# Patient Record
Sex: MALE | Race: ASIAN
[De-identification: names, ages, dates, MRNs, and addresses within clinical notes are randomized per-mention and may not be internally consistent; named-entity substitution may affect disease eponyms.]

---

## 2023-01-10 PROBLEM — Z00.00 ENCOUNTER FOR PREVENTIVE HEALTH EXAMINATION: Status: ACTIVE | Noted: 2023-01-10

## 2023-01-11 ENCOUNTER — APPOINTMENT (OUTPATIENT)
Dept: RHEUMATOLOGY | Facility: CLINIC | Age: 83
End: 2023-01-11
Payer: MEDICARE

## 2023-01-11 VITALS
DIASTOLIC BLOOD PRESSURE: 81 MMHG | BODY MASS INDEX: 27.09 KG/M2 | WEIGHT: 138 LBS | SYSTOLIC BLOOD PRESSURE: 145 MMHG | HEART RATE: 97 BPM | OXYGEN SATURATION: 97 % | HEIGHT: 60 IN

## 2023-01-11 DIAGNOSIS — Z86.79 PERSONAL HISTORY OF OTHER DISEASES OF THE CIRCULATORY SYSTEM: ICD-10-CM

## 2023-01-11 DIAGNOSIS — Z78.9 OTHER SPECIFIED HEALTH STATUS: ICD-10-CM

## 2023-01-11 DIAGNOSIS — Z87.39 PERSONAL HISTORY OF OTHER DISEASES OF THE MUSCULOSKELETAL SYSTEM AND CONNECTIVE TISSUE: ICD-10-CM

## 2023-01-11 DIAGNOSIS — M10.9 GOUT, UNSPECIFIED: ICD-10-CM

## 2023-01-11 PROCEDURE — 99205 OFFICE O/P NEW HI 60 MIN: CPT

## 2023-01-11 RX ORDER — CELECOXIB 200 MG/1
200 CAPSULE ORAL
Refills: 0 | Status: ACTIVE | COMMUNITY

## 2023-01-11 RX ORDER — IBUPROFEN 200 MG/1
200 TABLET, FILM COATED ORAL
Refills: 0 | Status: ACTIVE | COMMUNITY

## 2023-01-11 RX ORDER — COLCHICINE 0.6 MG/1
0.6 CAPSULE ORAL
Refills: 0 | Status: ACTIVE | COMMUNITY

## 2023-01-11 NOTE — PHYSICAL EXAM
[General Appearance - Alert] : alert [General Appearance - In No Acute Distress] : in no acute distress [Sclera] : the sclera and conjunctiva were normal [PERRL With Normal Accommodation] : pupils were equal in size, round, and reactive to light [Extraocular Movements] : extraocular movements were intact [Outer Ear] : the ears and nose were normal in appearance [Oropharynx] : the oropharynx was normal [Neck Appearance] : the appearance of the neck was normal [Neck Cervical Mass (___cm)] : no neck mass was observed [Jugular Venous Distention Increased] : there was no jugular-venous distention [Thyroid Diffuse Enlargement] : the thyroid was not enlarged [Thyroid Nodule] : there were no palpable thyroid nodules [Auscultation Breath Sounds / Voice Sounds] : lungs were clear to auscultation bilaterally [Heart Rate And Rhythm] : heart rate was normal and rhythm regular [Heart Sounds] : normal S1 and S2 [Heart Sounds Gallop] : no gallops [Murmurs] : no murmurs [Heart Sounds Pericardial Friction Rub] : no pericardial rub [Bowel Sounds] : normal bowel sounds [Abdomen Soft] : soft [Abdomen Tenderness] : non-tender [Abdomen Mass (___ Cm)] : no abdominal mass palpated [Abnormal Walk] : normal gait [Nail Clubbing] : no clubbing  or cyanosis of the fingernails [Involuntary Movements] : no involuntary movements were seen [Motor Tone] : muscle strength and tone were normal [FreeTextEntry1] : Right 2nd, 3rd, 4th toes swollen, red, tender. Right DIPs with heberden nodes [] : no rash [Affect] : the affect was normal [Mood] : the mood was normal

## 2023-01-11 NOTE — HISTORY OF PRESENT ILLNESS
[FreeTextEntry1] : 82 year old male with a history of hypertension, BPH, gout, kidney stones, here for evaluation of gout. He reports being diagnosed with gout 15 years ago after developing pain in his left big toe and then alternated to his right foot and big toe. He reports getting gout attacks monthly. Beer, shellfish, steak, meat, pepper, mustard all trigger his gout attacks. He usually takes Celebrex and Colchicine that help his attacks but his most recent attack 3 weeks ago is refractory to celebrex and colchicine, and his insurance is not paying for Colcrys. Motrin helps the pain somewhat but it's a shortlived effect. He denies being on allopurinol, but review of his chart indicates that he is on allopurinol 100 mg by his primary care doctor.  He has pain in his right 2nd, 3rd, 4th toes with redness and swelling.

## 2023-01-11 NOTE — ASSESSMENT
[FreeTextEntry1] : Gout\par -Diagnosed 15 years ago\par -Having monthly gout attacks. Current attack is refractory to his usual medications celebrex and colchicine\par -Check right foot x-ray\par -Check CBC, CMP, uric acid, HLA B*5801\par -Start prednisone 20 mg daily until symptoms resolve\par -Continue colchicine 0.6 mg daily\par -increase allopurinol to 200 mg daily after HLA B*5801\par -F/u 1 month

## 2023-02-14 ENCOUNTER — APPOINTMENT (OUTPATIENT)
Dept: RHEUMATOLOGY | Facility: CLINIC | Age: 83
End: 2023-02-14
Payer: MEDICARE

## 2023-02-14 VITALS
DIASTOLIC BLOOD PRESSURE: 80 MMHG | SYSTOLIC BLOOD PRESSURE: 137 MMHG | OXYGEN SATURATION: 97 % | BODY MASS INDEX: 27.48 KG/M2 | HEART RATE: 71 BPM | WEIGHT: 140 LBS | HEIGHT: 60 IN

## 2023-02-14 PROCEDURE — 99213 OFFICE O/P EST LOW 20 MIN: CPT

## 2023-03-13 NOTE — HISTORY OF PRESENT ILLNESS
[FreeTextEntry1] : 2/14/23:\par He reports taking prednisone and colchicine for 2 weeks and his symptoms of foot pain resolved. Denies any recurrence of gout. He didn't start allopurinol as the pharmacy did not dispense this medication. \par \par Initial visit:\par 82 year old male with a history of hypertension, BPH, gout, kidney stones, here for evaluation of gout. He reports being diagnosed with gout 15 years ago after developing pain in his left big toe and then alternated to his right foot and big toe. He reports getting gout attacks monthly. Beer, shellfish, steak, meat, pepper, mustard all trigger his gout attacks. He usually takes Celebrex and Colchicine that help his attacks but his most recent attack 3 weeks ago is refractory to celebrex and colchicine, and his insurance is not paying for Colcrys. Motrin helps the pain somewhat but it's a shortlived effect. He denies being on allopurinol, but review of his chart indicates that he is on allopurinol 100 mg by his primary care doctor.  He has pain in his right 2nd, 3rd, 4th toes with redness and swelling.

## 2023-03-13 NOTE — PHYSICAL EXAM
[General Appearance - Alert] : alert [General Appearance - In No Acute Distress] : in no acute distress [Sclera] : the sclera and conjunctiva were normal [PERRL With Normal Accommodation] : pupils were equal in size, round, and reactive to light [Extraocular Movements] : extraocular movements were intact [Outer Ear] : the ears and nose were normal in appearance [Oropharynx] : the oropharynx was normal [Neck Appearance] : the appearance of the neck was normal [Neck Cervical Mass (___cm)] : no neck mass was observed [Jugular Venous Distention Increased] : there was no jugular-venous distention [Thyroid Diffuse Enlargement] : the thyroid was not enlarged [Thyroid Nodule] : there were no palpable thyroid nodules [Auscultation Breath Sounds / Voice Sounds] : lungs were clear to auscultation bilaterally [Heart Rate And Rhythm] : heart rate was normal and rhythm regular [Heart Sounds Gallop] : no gallops [Heart Sounds] : normal S1 and S2 [Murmurs] : no murmurs [Heart Sounds Pericardial Friction Rub] : no pericardial rub [Bowel Sounds] : normal bowel sounds [Abdomen Soft] : soft [Abdomen Tenderness] : non-tender [Abdomen Mass (___ Cm)] : no abdominal mass palpated [Abnormal Walk] : normal gait [Nail Clubbing] : no clubbing  or cyanosis of the fingernails [Involuntary Movements] : no involuntary movements were seen [Motor Tone] : muscle strength and tone were normal [] : no rash [Affect] : the affect was normal [Mood] : the mood was normal [FreeTextEntry1] : No foot swelling or tenderness. Right DIPs with heberden nodes

## 2023-03-13 NOTE — ASSESSMENT
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 Current attack is refractory to his usual medications celebrex and colchicine\par -Right foot xray suggested tophi in right big toe\par -Uric acid 7.2\par -Prednisone 20 mg daily and colchicine 0.6 mg daily PRN for flare\par -Start allopurinol to 200 mg daily \par -Repeat uric acid for goal 5 or less\par -F/u 3 months or PRN\par \par 3/13/23: I spoke with wife he has having pain since starting allopurinol. Take colchicine 0.6 mg daily for ULT prophylaxis. Follow up in May they will reach out with any issues

## 2023-05-09 ENCOUNTER — APPOINTMENT (OUTPATIENT)
Dept: RHEUMATOLOGY | Facility: CLINIC | Age: 83
End: 2023-05-09
Payer: MEDICARE

## 2023-05-09 VITALS
HEART RATE: 74 BPM | DIASTOLIC BLOOD PRESSURE: 63 MMHG | BODY MASS INDEX: 26.43 KG/M2 | SYSTOLIC BLOOD PRESSURE: 136 MMHG | WEIGHT: 140 LBS | HEIGHT: 61 IN | OXYGEN SATURATION: 92 %

## 2023-05-09 PROCEDURE — 99213 OFFICE O/P EST LOW 20 MIN: CPT

## 2023-05-09 RX ORDER — PREDNISONE 20 MG/1
20 TABLET ORAL DAILY
Qty: 30 | Refills: 0 | Status: ACTIVE | COMMUNITY
Start: 2023-01-11 | End: 1900-01-01

## 2023-05-09 RX ORDER — COLCHICINE 0.6 MG/1
0.6 TABLET ORAL DAILY
Qty: 90 | Refills: 1 | Status: ACTIVE | COMMUNITY
Start: 2023-01-11 | End: 1900-01-01

## 2023-05-09 NOTE — ASSESSMENT
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

## 2023-05-09 NOTE — HISTORY OF PRESENT ILLNESS
[FreeTextEntry1] : 5/9/23:\par He reports no recent attacks of gout and feels fine today. He is taking allopurinol 100 mg 2 tabs/day and colchicine 0.6 mg daily\par \par \par 2/14/23:\par He reports taking prednisone and colchicine for 2 weeks and his symptoms of foot pain resolved. Denies any recurrence of gout. He didn't start allopurinol as the pharmacy did not dispense this medication. \par \par Initial visit:\par 82 year old male with a history of hypertension, BPH, gout, kidney stones, here for evaluation of gout. He reports being diagnosed with gout 15 years ago after developing pain in his left big toe and then alternated to his right foot and big toe. He reports getting gout attacks monthly. Beer, shellfish, steak, meat, pepper, mustard all trigger his gout attacks. He usually takes Celebrex and Colchicine that help his attacks but his most recent attack 3 weeks ago is refractory to celebrex and colchicine, and his insurance is not paying for Colcrys. Motrin helps the pain somewhat but it's a shortlived effect. He denies being on allopurinol, but review of his chart indicates that he is on allopurinol 100 mg by his primary care doctor.  He has pain in his right 2nd, 3rd, 4th toes with redness and swelling.

## 2023-08-09 ENCOUNTER — APPOINTMENT (OUTPATIENT)
Dept: RHEUMATOLOGY | Facility: CLINIC | Age: 83
End: 2023-08-09

## 2023-08-11 ENCOUNTER — RX RENEWAL (OUTPATIENT)
Age: 83
End: 2023-08-11

## 2023-08-11 RX ORDER — ALLOPURINOL 100 MG/1
100 TABLET ORAL
Qty: 60 | Refills: 3 | Status: ACTIVE | COMMUNITY
Start: 2023-01-18 | End: 1900-01-01

## 2023-12-09 ENCOUNTER — INPATIENT (INPATIENT)
Facility: HOSPITAL | Age: 83
LOS: 1 days | Discharge: HOME | DRG: 872 | End: 2023-12-11
Attending: INTERNAL MEDICINE | Admitting: INTERNAL MEDICINE
Payer: MEDICARE

## 2023-12-09 VITALS
OXYGEN SATURATION: 98 % | HEART RATE: 71 BPM | SYSTOLIC BLOOD PRESSURE: 136 MMHG | RESPIRATION RATE: 17 BRPM | DIASTOLIC BLOOD PRESSURE: 76 MMHG | TEMPERATURE: 97 F

## 2023-12-09 DIAGNOSIS — A41.9 SEPSIS, UNSPECIFIED ORGANISM: ICD-10-CM

## 2023-12-09 LAB
ACANTHOCYTES BLD QL SMEAR: SLIGHT — SIGNIFICANT CHANGE UP
ACANTHOCYTES BLD QL SMEAR: SLIGHT — SIGNIFICANT CHANGE UP
ALBUMIN SERPL ELPH-MCNC: 4.3 G/DL — SIGNIFICANT CHANGE UP (ref 3.5–5.2)
ALBUMIN SERPL ELPH-MCNC: 4.3 G/DL — SIGNIFICANT CHANGE UP (ref 3.5–5.2)
ALP SERPL-CCNC: 70 U/L — SIGNIFICANT CHANGE UP (ref 30–115)
ALP SERPL-CCNC: 70 U/L — SIGNIFICANT CHANGE UP (ref 30–115)
ALT FLD-CCNC: 16 U/L — SIGNIFICANT CHANGE UP (ref 0–41)
ALT FLD-CCNC: 16 U/L — SIGNIFICANT CHANGE UP (ref 0–41)
ANION GAP SERPL CALC-SCNC: 12 MMOL/L — SIGNIFICANT CHANGE UP (ref 7–14)
ANION GAP SERPL CALC-SCNC: 12 MMOL/L — SIGNIFICANT CHANGE UP (ref 7–14)
ANISOCYTOSIS BLD QL: SLIGHT — SIGNIFICANT CHANGE UP
ANISOCYTOSIS BLD QL: SLIGHT — SIGNIFICANT CHANGE UP
APTT BLD: 27.2 SEC — SIGNIFICANT CHANGE UP (ref 27–39.2)
APTT BLD: 27.2 SEC — SIGNIFICANT CHANGE UP (ref 27–39.2)
AST SERPL-CCNC: 28 U/L — SIGNIFICANT CHANGE UP (ref 0–41)
AST SERPL-CCNC: 28 U/L — SIGNIFICANT CHANGE UP (ref 0–41)
BASOPHILS # BLD AUTO: 0.04 K/UL — SIGNIFICANT CHANGE UP (ref 0–0.2)
BASOPHILS # BLD AUTO: 0.04 K/UL — SIGNIFICANT CHANGE UP (ref 0–0.2)
BASOPHILS NFR BLD AUTO: 0.8 % — SIGNIFICANT CHANGE UP (ref 0–1)
BASOPHILS NFR BLD AUTO: 0.8 % — SIGNIFICANT CHANGE UP (ref 0–1)
BILIRUB SERPL-MCNC: 0.4 MG/DL — SIGNIFICANT CHANGE UP (ref 0.2–1.2)
BILIRUB SERPL-MCNC: 0.4 MG/DL — SIGNIFICANT CHANGE UP (ref 0.2–1.2)
BUN SERPL-MCNC: 18 MG/DL — SIGNIFICANT CHANGE UP (ref 10–20)
BUN SERPL-MCNC: 18 MG/DL — SIGNIFICANT CHANGE UP (ref 10–20)
BURR CELLS BLD QL SMEAR: PRESENT — SIGNIFICANT CHANGE UP
BURR CELLS BLD QL SMEAR: PRESENT — SIGNIFICANT CHANGE UP
CALCIUM SERPL-MCNC: 8.5 MG/DL — SIGNIFICANT CHANGE UP (ref 8.4–10.4)
CALCIUM SERPL-MCNC: 8.5 MG/DL — SIGNIFICANT CHANGE UP (ref 8.4–10.4)
CHLORIDE SERPL-SCNC: 98 MMOL/L — SIGNIFICANT CHANGE UP (ref 98–110)
CHLORIDE SERPL-SCNC: 98 MMOL/L — SIGNIFICANT CHANGE UP (ref 98–110)
CK MB CFR SERPL CALC: 1.5 NG/ML — SIGNIFICANT CHANGE UP (ref 0.6–6.3)
CK MB CFR SERPL CALC: 1.5 NG/ML — SIGNIFICANT CHANGE UP (ref 0.6–6.3)
CK SERPL-CCNC: 153 U/L — SIGNIFICANT CHANGE UP (ref 0–225)
CK SERPL-CCNC: 153 U/L — SIGNIFICANT CHANGE UP (ref 0–225)
CO2 SERPL-SCNC: 23 MMOL/L — SIGNIFICANT CHANGE UP (ref 17–32)
CO2 SERPL-SCNC: 23 MMOL/L — SIGNIFICANT CHANGE UP (ref 17–32)
CREAT SERPL-MCNC: 1.2 MG/DL — SIGNIFICANT CHANGE UP (ref 0.7–1.5)
CREAT SERPL-MCNC: 1.2 MG/DL — SIGNIFICANT CHANGE UP (ref 0.7–1.5)
EGFR: 60 ML/MIN/1.73M2 — SIGNIFICANT CHANGE UP
EGFR: 60 ML/MIN/1.73M2 — SIGNIFICANT CHANGE UP
EOSINOPHIL # BLD AUTO: 0 K/UL — SIGNIFICANT CHANGE UP (ref 0–0.7)
EOSINOPHIL # BLD AUTO: 0 K/UL — SIGNIFICANT CHANGE UP (ref 0–0.7)
EOSINOPHIL NFR BLD AUTO: 0 % — SIGNIFICANT CHANGE UP (ref 0–8)
EOSINOPHIL NFR BLD AUTO: 0 % — SIGNIFICANT CHANGE UP (ref 0–8)
ETHANOL SERPL-MCNC: <10 MG/DL — SIGNIFICANT CHANGE UP
ETHANOL SERPL-MCNC: <10 MG/DL — SIGNIFICANT CHANGE UP
FLUAV AG NPH QL: DETECTED
FLUAV AG NPH QL: DETECTED
FLUBV AG NPH QL: SIGNIFICANT CHANGE UP
FLUBV AG NPH QL: SIGNIFICANT CHANGE UP
GIANT PLATELETS BLD QL SMEAR: PRESENT — SIGNIFICANT CHANGE UP
GIANT PLATELETS BLD QL SMEAR: PRESENT — SIGNIFICANT CHANGE UP
GLUCOSE SERPL-MCNC: 120 MG/DL — HIGH (ref 70–99)
GLUCOSE SERPL-MCNC: 120 MG/DL — HIGH (ref 70–99)
HCT VFR BLD CALC: 38.1 % — LOW (ref 42–52)
HCT VFR BLD CALC: 38.1 % — LOW (ref 42–52)
HGB BLD-MCNC: 12.3 G/DL — LOW (ref 14–18)
HGB BLD-MCNC: 12.3 G/DL — LOW (ref 14–18)
HYPOCHROMIA BLD QL: SLIGHT — SIGNIFICANT CHANGE UP
HYPOCHROMIA BLD QL: SLIGHT — SIGNIFICANT CHANGE UP
INR BLD: 1.1 RATIO — SIGNIFICANT CHANGE UP (ref 0.65–1.3)
INR BLD: 1.1 RATIO — SIGNIFICANT CHANGE UP (ref 0.65–1.3)
LACTATE SERPL-SCNC: 1.2 MMOL/L — SIGNIFICANT CHANGE UP (ref 0.7–2)
LACTATE SERPL-SCNC: 1.2 MMOL/L — SIGNIFICANT CHANGE UP (ref 0.7–2)
LYMPHOCYTES # BLD AUTO: 0.27 K/UL — LOW (ref 1.2–3.4)
LYMPHOCYTES # BLD AUTO: 0.27 K/UL — LOW (ref 1.2–3.4)
LYMPHOCYTES # BLD AUTO: 5.2 % — LOW (ref 20.5–51.1)
LYMPHOCYTES # BLD AUTO: 5.2 % — LOW (ref 20.5–51.1)
MANUAL SMEAR VERIFICATION: SIGNIFICANT CHANGE UP
MANUAL SMEAR VERIFICATION: SIGNIFICANT CHANGE UP
MCHC RBC-ENTMCNC: 22.6 PG — LOW (ref 27–31)
MCHC RBC-ENTMCNC: 22.6 PG — LOW (ref 27–31)
MCHC RBC-ENTMCNC: 32.3 G/DL — SIGNIFICANT CHANGE UP (ref 32–37)
MCHC RBC-ENTMCNC: 32.3 G/DL — SIGNIFICANT CHANGE UP (ref 32–37)
MCV RBC AUTO: 70 FL — LOW (ref 80–94)
MCV RBC AUTO: 70 FL — LOW (ref 80–94)
METAMYELOCYTES # FLD: 0.9 % — HIGH (ref 0–0)
METAMYELOCYTES # FLD: 0.9 % — HIGH (ref 0–0)
MICROCYTES BLD QL: SLIGHT — SIGNIFICANT CHANGE UP
MICROCYTES BLD QL: SLIGHT — SIGNIFICANT CHANGE UP
MONOCYTES # BLD AUTO: 0.4 K/UL — SIGNIFICANT CHANGE UP (ref 0.1–0.6)
MONOCYTES # BLD AUTO: 0.4 K/UL — SIGNIFICANT CHANGE UP (ref 0.1–0.6)
MONOCYTES NFR BLD AUTO: 7.7 % — SIGNIFICANT CHANGE UP (ref 1.7–9.3)
MONOCYTES NFR BLD AUTO: 7.7 % — SIGNIFICANT CHANGE UP (ref 1.7–9.3)
NEUTROPHILS # BLD AUTO: 4.18 K/UL — SIGNIFICANT CHANGE UP (ref 1.4–6.5)
NEUTROPHILS # BLD AUTO: 4.18 K/UL — SIGNIFICANT CHANGE UP (ref 1.4–6.5)
NEUTROPHILS NFR BLD AUTO: 80.2 % — HIGH (ref 42.2–75.2)
NEUTROPHILS NFR BLD AUTO: 80.2 % — HIGH (ref 42.2–75.2)
NRBC # BLD: 1 /100 WBCS — HIGH (ref 0–0)
NRBC # BLD: 1 /100 WBCS — HIGH (ref 0–0)
NRBC # BLD: SIGNIFICANT CHANGE UP /100 WBCS (ref 0–0)
NRBC # BLD: SIGNIFICANT CHANGE UP /100 WBCS (ref 0–0)
PLAT MORPH BLD: ABNORMAL
PLAT MORPH BLD: ABNORMAL
PLATELET # BLD AUTO: 211 K/UL — SIGNIFICANT CHANGE UP (ref 130–400)
PLATELET # BLD AUTO: 211 K/UL — SIGNIFICANT CHANGE UP (ref 130–400)
PMV BLD: 10 FL — SIGNIFICANT CHANGE UP (ref 7.4–10.4)
PMV BLD: 10 FL — SIGNIFICANT CHANGE UP (ref 7.4–10.4)
POIKILOCYTOSIS BLD QL AUTO: SLIGHT — SIGNIFICANT CHANGE UP
POIKILOCYTOSIS BLD QL AUTO: SLIGHT — SIGNIFICANT CHANGE UP
POLYCHROMASIA BLD QL SMEAR: SLIGHT — SIGNIFICANT CHANGE UP
POLYCHROMASIA BLD QL SMEAR: SLIGHT — SIGNIFICANT CHANGE UP
POTASSIUM SERPL-MCNC: 4.6 MMOL/L — SIGNIFICANT CHANGE UP (ref 3.5–5)
POTASSIUM SERPL-MCNC: 4.6 MMOL/L — SIGNIFICANT CHANGE UP (ref 3.5–5)
POTASSIUM SERPL-SCNC: 4.6 MMOL/L — SIGNIFICANT CHANGE UP (ref 3.5–5)
POTASSIUM SERPL-SCNC: 4.6 MMOL/L — SIGNIFICANT CHANGE UP (ref 3.5–5)
PROT SERPL-MCNC: 6.7 G/DL — SIGNIFICANT CHANGE UP (ref 6–8)
PROT SERPL-MCNC: 6.7 G/DL — SIGNIFICANT CHANGE UP (ref 6–8)
PROTHROM AB SERPL-ACNC: 12.6 SEC — SIGNIFICANT CHANGE UP (ref 9.95–12.87)
PROTHROM AB SERPL-ACNC: 12.6 SEC — SIGNIFICANT CHANGE UP (ref 9.95–12.87)
RBC # BLD: 5.44 M/UL — SIGNIFICANT CHANGE UP (ref 4.7–6.1)
RBC # BLD: 5.44 M/UL — SIGNIFICANT CHANGE UP (ref 4.7–6.1)
RBC # FLD: 17.6 % — HIGH (ref 11.5–14.5)
RBC # FLD: 17.6 % — HIGH (ref 11.5–14.5)
RBC BLD AUTO: ABNORMAL
RBC BLD AUTO: ABNORMAL
RSV RNA NPH QL NAA+NON-PROBE: SIGNIFICANT CHANGE UP
RSV RNA NPH QL NAA+NON-PROBE: SIGNIFICANT CHANGE UP
SARS-COV-2 RNA SPEC QL NAA+PROBE: SIGNIFICANT CHANGE UP
SARS-COV-2 RNA SPEC QL NAA+PROBE: SIGNIFICANT CHANGE UP
SODIUM SERPL-SCNC: 133 MMOL/L — LOW (ref 135–146)
SODIUM SERPL-SCNC: 133 MMOL/L — LOW (ref 135–146)
STOMATOCYTES BLD QL SMEAR: SLIGHT — SIGNIFICANT CHANGE UP
STOMATOCYTES BLD QL SMEAR: SLIGHT — SIGNIFICANT CHANGE UP
TARGETS BLD QL SMEAR: SLIGHT — SIGNIFICANT CHANGE UP
TARGETS BLD QL SMEAR: SLIGHT — SIGNIFICANT CHANGE UP
TROPONIN T SERPL-MCNC: <0.01 NG/ML — SIGNIFICANT CHANGE UP
TROPONIN T SERPL-MCNC: <0.01 NG/ML — SIGNIFICANT CHANGE UP
VARIANT LYMPHS # BLD: 5.2 % — HIGH (ref 0–5)
VARIANT LYMPHS # BLD: 5.2 % — HIGH (ref 0–5)
WBC # BLD: 5.21 K/UL — SIGNIFICANT CHANGE UP (ref 4.8–10.8)
WBC # BLD: 5.21 K/UL — SIGNIFICANT CHANGE UP (ref 4.8–10.8)
WBC # FLD AUTO: 5.21 K/UL — SIGNIFICANT CHANGE UP (ref 4.8–10.8)
WBC # FLD AUTO: 5.21 K/UL — SIGNIFICANT CHANGE UP (ref 4.8–10.8)

## 2023-12-09 PROCEDURE — 0042T: CPT | Mod: MA

## 2023-12-09 PROCEDURE — 99223 1ST HOSP IP/OBS HIGH 75: CPT

## 2023-12-09 PROCEDURE — 85025 COMPLETE CBC W/AUTO DIFF WBC: CPT

## 2023-12-09 PROCEDURE — 83735 ASSAY OF MAGNESIUM: CPT

## 2023-12-09 PROCEDURE — 81001 URINALYSIS AUTO W/SCOPE: CPT

## 2023-12-09 PROCEDURE — 36415 COLL VENOUS BLD VENIPUNCTURE: CPT

## 2023-12-09 PROCEDURE — 84145 PROCALCITONIN (PCT): CPT

## 2023-12-09 PROCEDURE — 71045 X-RAY EXAM CHEST 1 VIEW: CPT | Mod: 26

## 2023-12-09 PROCEDURE — 99285 EMERGENCY DEPT VISIT HI MDM: CPT

## 2023-12-09 PROCEDURE — 80053 COMPREHEN METABOLIC PANEL: CPT

## 2023-12-09 PROCEDURE — 83036 HEMOGLOBIN GLYCOSYLATED A1C: CPT

## 2023-12-09 PROCEDURE — 93010 ELECTROCARDIOGRAM REPORT: CPT

## 2023-12-09 PROCEDURE — 70450 CT HEAD/BRAIN W/O DYE: CPT | Mod: 26,XU,MA

## 2023-12-09 PROCEDURE — 87086 URINE CULTURE/COLONY COUNT: CPT

## 2023-12-09 PROCEDURE — 70496 CT ANGIOGRAPHY HEAD: CPT | Mod: 26,MA

## 2023-12-09 PROCEDURE — 70498 CT ANGIOGRAPHY NECK: CPT | Mod: 26,MA

## 2023-12-09 RX ORDER — PANTOPRAZOLE SODIUM 20 MG/1
40 TABLET, DELAYED RELEASE ORAL
Refills: 0 | Status: DISCONTINUED | OUTPATIENT
Start: 2023-12-09 | End: 2023-12-11

## 2023-12-09 RX ORDER — ERYTHROMYCIN BASE 5 MG/GRAM
1 OINTMENT (GRAM) OPHTHALMIC (EYE) EVERY 6 HOURS
Refills: 0 | Status: DISCONTINUED | OUTPATIENT
Start: 2023-12-09 | End: 2023-12-11

## 2023-12-09 RX ORDER — ACETAMINOPHEN 500 MG
975 TABLET ORAL ONCE
Refills: 0 | Status: COMPLETED | OUTPATIENT
Start: 2023-12-09 | End: 2023-12-09

## 2023-12-09 RX ORDER — SODIUM CHLORIDE 9 MG/ML
1000 INJECTION INTRAMUSCULAR; INTRAVENOUS; SUBCUTANEOUS
Refills: 0 | Status: DISCONTINUED | OUTPATIENT
Start: 2023-12-09 | End: 2023-12-11

## 2023-12-09 RX ORDER — ENOXAPARIN SODIUM 100 MG/ML
40 INJECTION SUBCUTANEOUS EVERY 24 HOURS
Refills: 0 | Status: DISCONTINUED | OUTPATIENT
Start: 2023-12-09 | End: 2023-12-11

## 2023-12-09 RX ORDER — SODIUM CHLORIDE 9 MG/ML
2500 INJECTION, SOLUTION INTRAVENOUS ONCE
Refills: 0 | Status: COMPLETED | OUTPATIENT
Start: 2023-12-09 | End: 2023-12-09

## 2023-12-09 RX ORDER — TAMSULOSIN HYDROCHLORIDE 0.4 MG/1
0.4 CAPSULE ORAL AT BEDTIME
Refills: 0 | Status: DISCONTINUED | OUTPATIENT
Start: 2023-12-09 | End: 2023-12-11

## 2023-12-09 RX ADMIN — SODIUM CHLORIDE 2500 MILLILITER(S): 9 INJECTION, SOLUTION INTRAVENOUS at 15:30

## 2023-12-09 RX ADMIN — SODIUM CHLORIDE 5000 MILLILITER(S): 9 INJECTION, SOLUTION INTRAVENOUS at 14:31

## 2023-12-09 RX ADMIN — TAMSULOSIN HYDROCHLORIDE 0.4 MILLIGRAM(S): 0.4 CAPSULE ORAL at 22:08

## 2023-12-09 RX ADMIN — Medication 75 MILLIGRAM(S): at 21:31

## 2023-12-09 RX ADMIN — Medication 975 MILLIGRAM(S): at 14:40

## 2023-12-09 NOTE — ED ADULT NURSE NOTE - NSFALLHARMRISKINTERV_ED_ALL_ED
Hide Additional Notes?: No Detail Level: Generalized Additional Notes (Optional): Optional cosmetic removal$100-$150 Detail Level: Detailed Assistance OOB with selected safe patient handling equipment if applicable/Assistance with ambulation/Communicate risk of Fall with Harm to all staff, patient, and family/Encourage patient to sit up slowly, dangle for a short time, stand at bedside before walking/Monitor gait and stability/Orthostatic vital signs/Provide visual cue: red socks, yellow wristband, yellow gown, etc/Reinforce activity limits and safety measures with patient and family/Bed in lowest position, wheels locked, appropriate side rails in place/Call bell, personal items and telephone in reach/Instruct patient to call for assistance before getting out of bed/chair/stretcher/Non-slip footwear applied when patient is off stretcher/[place] to call system/Physically safe environment - no spills, clutter or unnecessary equipment/Purposeful Proactive Rounding/Room/bathroom lighting operational, light cord in reach Assistance OOB with selected safe patient handling equipment if applicable/Assistance with ambulation/Communicate risk of Fall with Harm to all staff, patient, and family/Encourage patient to sit up slowly, dangle for a short time, stand at bedside before walking/Monitor gait and stability/Orthostatic vital signs/Provide visual cue: red socks, yellow wristband, yellow gown, etc/Reinforce activity limits and safety measures with patient and family/Bed in lowest position, wheels locked, appropriate side rails in place/Call bell, personal items and telephone in reach/Instruct patient to call for assistance before getting out of bed/chair/stretcher/Non-slip footwear applied when patient is off stretcher/[place] to call system/Physically safe environment - no spills, clutter or unnecessary equipment/Purposeful Proactive Rounding/Room/bathroom lighting operational, light cord in reach

## 2023-12-09 NOTE — ED ADULT NURSE NOTE - NSSEPSISSUSPECTED_ED_A_ED
Attempted IV access x 3. Unsuccessful. Another nurse to try at this time. Awaiting further orders, will continue to monitor.      Tanesha Kendall RN  12/07/17 0126     No

## 2023-12-09 NOTE — ED ADULT NURSE NOTE - OBJECTIVE STATEMENT
82 y/o male BIBA s/p syncopal episode. pt was noted to have altered mental status after syncopal episode 84 y/o male BIBA s/p syncopal episode. pt was noted to have altered mental status after syncopal episode

## 2023-12-09 NOTE — H&P ADULT - HISTORY OF PRESENT ILLNESS
83 M PMH of HTN, HLD presented to ED after syncopal episode of barking.  Patient said he generally felt unwell today so was seen [place] [facility] but does not know what happened there.  Went to [facility] after and had a presyncopal episode so EMS was called.  Here patient states he feels generally weak and unwell but has no specific complaints at this time.  Notes dry cough but denies vomiting, diarrhea and has no complaints of pain at this time.    temp 103.7, hr 71, bp 136/76, rr 20 sat 97 on RA  wbc 5.21, hg 12.3, mcv 70, neutro % 80.2, reactive lymphocytes 5.2, lactate 1.2, na 133, trop neg. influenza a positive     < from: 12 Lead ECG (12.09.23 @ 14:32) >  Diagnosis Line Normal sinus rhythm  Cannot rule out Anterior infarct , age undetermined    < end of copied text >    CT PERFUSION:    No evidence of perfusion abnormality.    CTA HEAD/NECK:    No evidence of carotid or vertebral artery high-grade stenosis.    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    2.1 cm hypodense nodule in the right thyroid gland. A thyroid sonogram   can be obtained for further evaluation.  _____________  NASCET criteria for internal carotid artery stenosis:  Mild: 0% to 49%  Moderate: 50% to 69%  Severe: 70% to 99%  Complete Occlusion    < end of copied text >  < from: CT Brain Stroke Protocol (12.09.23 @ 13:57) >  No CT evidence for acute intracranial hemorrhage or large territorial   infarct.    < end of copied text >    CXR neg my read     was given one time levofloxacin and 2500 lr bolus in ed.  cultures sent.  admitted for flu started on Tamiflu      83 M PMH of HTN, HLD presented to ED after syncopal episode of barking.  Patient said he generally felt unwell today so was seen at [facility] but does not know what happened there.  Went to [facility] after and had a presyncopal episode so EMS was called.  Here patient states he feels generally weak and unwell but has no specific complaints at this time.  Notes dry cough but denies vomiting, diarrhea and has no complaints of pain at this time.    temp 103.7, hr 71, bp 136/76, rr 20 sat 97 on RA  wbc 5.21, hg 12.3, mcv 70, neutro % 80.2, reactive lymphocytes 5.2, lactate 1.2, na 133, trop neg. influenza a positive     < from: 12 Lead ECG (12.09.23 @ 14:32) >  Diagnosis Line Normal sinus rhythm  Cannot rule out Anterior infarct , age undetermined    < end of copied text >    CT PERFUSION:    No evidence of perfusion abnormality.    CTA HEAD/NECK:    No evidence of carotid or vertebral artery high-grade stenosis.    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    2.1 cm hypodense nodule in the right thyroid gland. A thyroid sonogram   can be obtained for further evaluation.  _____________  NASCET criteria for internal carotid artery stenosis:  Mild: 0% to 49%  Moderate: 50% to 69%  Severe: 70% to 99%  Complete Occlusion    < end of copied text >  < from: CT Brain Stroke Protocol (12.09.23 @ 13:57) >  No CT evidence for acute intracranial hemorrhage or large territorial   infarct.    < end of copied text >    CXR neg my read     was given one time levofloxacin and 2500 lr bolus in ed.  cultures sent.  admitted for flu started on Tamiflu      83 M PMH of HTN, HLD, enlarged prostate presented to ED after syncopal episode. patient today was feeling weakness and dizziness went to urgent care, after urgent care went to [facility]. while there he had syncope episode and was brought to ED by ems. was stroke code in ED, work up negative. reports not having any symptoms after tylenol. tested positive for flu a.     temp 103.7, hr 71, bp 136/76, rr 20 sat 97 on RA  wbc 5.21, hg 12.3, mcv 70, neutro % 80.2, reactive lymphocytes 5.2, lactate 1.2, na 133, trop neg. influenza a positive     < from: 12 Lead ECG (12.09.23 @ 14:32) >  Diagnosis Line Normal sinus rhythm  Cannot rule out Anterior infarct , age undetermined    < end of copied text >    CT PERFUSION:    No evidence of perfusion abnormality.    CTA HEAD/NECK:    No evidence of carotid or vertebral artery high-grade stenosis.    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    2.1 cm hypodense nodule in the right thyroid gland. A thyroid sonogram   can be obtained for further evaluation.  _____________  NASCET criteria for internal carotid artery stenosis:  Mild: 0% to 49%  Moderate: 50% to 69%  Severe: 70% to 99%  Complete Occlusion    < end of copied text >  < from: CT Brain Stroke Protocol (12.09.23 @ 13:57) >  No CT evidence for acute intracranial hemorrhage or large territorial   infarct.    < end of copied text >    CXR neg my read     was given one time levofloxacin and 2500 lr bolus in ed.  cultures sent.  admitted for flu started on Tamiflu  83 M PMH of HTN, HLD, enlarged prostate presented to ED after syncopal episode. patient today was feeling weakness and dizziness went to urgent care, after urgent care went to [facility]. while there he had syncope episode and was brought to ED by ems. was stroke code in ED, work up negative. reports not having any symptoms after tylenol. tested positive for flu a.     temp 103.7, hr 71, bp 136/76, rr 20 sat 97 on RA  wbc 5.21, hg 12.3, mcv 70, neutro % 80.2, reactive lymphocytes 5.2, lactate 1.2, na 133, trop neg. influenza a positive     < from: 12 Lead ECG (12.09.23 @ 14:32) >  Diagnosis Line Normal sinus rhythm  Cannot rule out Anterior infarct , age undetermined    < end of copied text >    CT PERFUSION:    No evidence of perfusion abnormality.    CTA HEAD/NECK:    No evidence of carotid or vertebral artery high-grade stenosis.    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    2.1 cm hypodense nodule in the right thyroid gland. A thyroid sonogram   can be obtained for further evaluation.  _____________  NASCET criteria for internal carotid artery stenosis:  Mild: 0% to 49%  Moderate: 50% to 69%  Severe: 70% to 99%  Complete Occlusion    < end of copied text >  < from: CT Brain Stroke Protocol (12.09.23 @ 13:57) >  No CT evidence for acute intracranial hemorrhage or large territorial   infarct.    < end of copied text >    CXR neg my read     was given one time levofloxacin and 2500 lr bolus in ed.  cultures sent.  admitted for flu started on Tamiflu  83 M PMH of HTN, HLD, enlarged prostate presented to ED after pre-syncopal episode. patient today was feeling weakness and dizziness went to urgent care, after urgent care went to [facility], while he was there, had pre-syncope episode so EMS was called. Patient reports that he was walking to the car and was feeling very weak, so he tried to hold onto something and then slide down. Denied LOC. Denied head trauma.  s/p stroke code in ED, work up negative. reports not having any symptoms after tylenol. tested positive for flu a.     temp 103.7, hr 71, bp 136/76, rr 20 sat 97 on RA  wbc 5.21, hg 12.3, mcv 70, neutro % 80.2, reactive lymphocytes 5.2, lactate 1.2, na 133, trop neg. influenza a positive     < from: 12 Lead ECG (12.09.23 @ 14:32) >  Diagnosis Line Normal sinus rhythm  Cannot rule out Anterior infarct , age undetermined    < end of copied text >    CT PERFUSION:    No evidence of perfusion abnormality.    CTA HEAD/NECK:    No evidence of carotid or vertebral artery high-grade stenosis.    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    2.1 cm hypodense nodule in the right thyroid gland. A thyroid sonogram   can be obtained for further evaluation.  _____________  NASCET criteria for internal carotid artery stenosis:  Mild: 0% to 49%  Moderate: 50% to 69%  Severe: 70% to 99%  Complete Occlusion    < end of copied text >  < from: CT Brain Stroke Protocol (12.09.23 @ 13:57) >  No CT evidence for acute intracranial hemorrhage or large territorial   infarct.    < end of copied text >    CXR neg my read     was given one time levofloxacin and 2500 lr bolus in ed.  cultures sent.  admitted for flu started on Tamiflu  83 M PMH of HTN, HLD, enlarged prostate presented to ED after pre-syncopal episode. patient today was feeling weakness and dizziness went to urgent care, after urgent care went to [facility], while he was there, had pre-syncope episode so EMS was called. Patient reports that he was walking to the car and was feeling very weak, so he tried to hold onto something and then slide down. Denied LOC. Denied head trauma.  s/p stroke code in ED, work up negative. reports not having any symptoms after tylenol. tested positive for flu a.     temp 103.7, hr 71, bp 136/76, rr 20 sat 97 on RA  wbc 5.21, hg 12.3, mcv 70, neutro % 80.2, reactive lymphocytes 5.2, lactate 1.2, na 133, trop neg. influenza a positive     < from: 12 Lead ECG (12.09.23 @ 14:32) >  Diagnosis Line Normal sinus rhythm  Cannot rule out Anterior infarct , age undetermined    < end of copied text >    CT PERFUSION:    No evidence of perfusion abnormality.    CTA HEAD/NECK:    No evidence of carotid or vertebral artery high-grade stenosis.    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    2.1 cm hypodense nodule in the right thyroid gland. A thyroid sonogram   can be obtained for further evaluation.  _____________  NASCET criteria for internal carotid artery stenosis:  Mild: 0% to 49%  Moderate: 50% to 69%  Severe: 70% to 99%  Complete Occlusion    < end of copied text >  < from: CT Brain Stroke Protocol (12.09.23 @ 13:57) >  No CT evidence for acute intracranial hemorrhage or large territorial   infarct.    < end of copied text >    CXR neg my read     was given one time levofloxacin and 2500 lr bolus in ed.  cultures sent.  admitted for flu started on Tamiflu

## 2023-12-09 NOTE — ED PROVIDER NOTE - DIFFERENTIAL DIAGNOSIS
The differential diagnosis for patients clinical presentation includes but is not limited to:  viral syndrome, sepsis, septic shock, syncope, pneumonia, meningitis Differential Diagnosis

## 2023-12-09 NOTE — ED ADULT TRIAGE NOTE - CHIEF COMPLAINT QUOTE
[facility] s[facility]p syncopal episode  [facility] & syncopized to ground, dropping whopper on floor  EMS was called & pt with AMS attempting to operate vehicle, keys were obtained from pt & sent to ED  stroke code activated,    also with b/l eye redness & drainage [facility] syncopal episode  [facility] & syncopized to ground, dropping whopper on floor  EMS was called & pt with AMS attempting to operate vehicle, keys were obtained from pt & sent to ED  stroke code activated,    also with b/l eye redness & drainage

## 2023-12-09 NOTE — ED PROVIDER NOTE - OBJECTIVE STATEMENT
83 M PMH of HTN, HLD presented to ED after syncopal episode of barking.  Patient said he generally felt unwell today so was seen at [facility] but does not know what happened there.  Went to [facility] after and had a presyncopal episode so EMS was called.  Here patient states he feels generally weak and unwell but has no specific complaints at this time.  Notes dry cough but denies vomiting, diarrhea and has no complaints of pain at this time. 83 M PMH of HTN, HLD presented to ED after syncopal episode of barking.  Patient said he generally felt unwell today so was seen at [facility] but does not know what happened there.  Went to [facility] after and had a presyncopal episode so EMS was called.  Here patient states he feels generally weak and unwell but has no specific complaints at this time.  Notes dry cough but denies vomiting, diarrhea and has no complaints of pain at this time.

## 2023-12-09 NOTE — H&P ADULT - ATTENDING COMMENTS
Patient seen and examined at bedside independently of the residents. I read the resident's note and agree with the plan with the additions and corrections as noted below. My note supersedes the resident's note.     REVIEW OF SYSTEMS:  Negative except in HPI.     PMH: HTN, and HLD    FHx: Reviewed. No fhx of asthma/copd, No fhx of liver and pulmonary disease. No fhx of hematological disorder.     Physical Exam:  GEN: No acute distress. Awake, Alert and oriented x 3.   Head: Atraumatic, Normocephalic.   Eye: PEERLA. No sclera icterus. EOMI.  B/L eyes conjunctivitis with purulent discharges/crusting.  ENT: Normal oropharynx, no thyromegaly, no mass, no lymphadenopathy.   LUNGS: Clear to auscultation bilaterally. No wheeze/rales/crackles.   HEART: Normal. S1/S2 present. RRR. No murmur/gallops.   ABD: Soft, non-tender, non-distended. Bowel sounds present.   EXT: No pitting edema. No erythema. No tenderness.  Integumentary: No rash, No sore, No petechia.   NEURO: CN III-XII intact. Strength: 5/5 b/l ULE. Sensory intact b/l ULE.     Vital Signs Last 24 Hrs  T(C): 37.7 (09 Dec 2023 16:00), Max: 39.8 (09 Dec 2023 14:25)  T(F): 99.8 (09 Dec 2023 16:00), Max: 103.7 (09 Dec 2023 14:25)  HR: 80 (09 Dec 2023 16:00) (71 - 84)  BP: 137/64 (09 Dec 2023 16:00) (133/61 - 171/70)  BP(mean): --  RR: 20 (09 Dec 2023 16:00) (17 - 20)  SpO2: 97% (09 Dec 2023 16:00) (94% - 98%)    Parameters below as of 09 Dec 2023 16:00  Patient On (Oxygen Delivery Method): room air      Please see the above notes for Labs and radiology.     Assessment and Plan:     84 yo M with hx of HTN, and HLD presents to ED after pre- syncopal episode.     Pre-Syncope likely 2/2 generalized weakness 2/2 influenza A   - CTH: neg for acute intracranial pathology.   - CTP: unremarkable.   - CTA H & N: No evidence of carotid or vertebral artery high-grade stenosis.  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  - EKG: NSR. Non ischemic.   - Troponin neg x 1. Check 2nd Troponin.   - check orthostatic VS  - s/p 2.5L LR bolus in ED. c/w gentle hydration   - fall precaution.     Fever likely 2/2 influenza A  - No WBC.   - CXR unremarkable to my read --> pending official report.   - will start in Tamiflu   - check UA and UCx, BCx and procalcitonin.   - s/p levaquin x 1 in ED --> monitor off abx for now.     B/L viral eye conjunctivitis with possible bacterial superimposed infection  - will start on erythromycin ointment q6h x 7 days.    Right Thyroid nodule  - CT shows 2.1 cm hypodense nodule in the right thyroid gland.   - Recommend outpatient follow up with thyroid US    HTN - hold anti-HTN medication.   HLD - c/w home med    DVT ppx: Lovenox SC  GI ppx: not indicated.   Diet: DASH diet  Activity: as tolerated.     Date seen by the attendin2023  Total time spent: 75 minutes. Patient seen and examined at bedside independently of the residents. I read the resident's note and agree with the plan with the additions and corrections as noted below. My note supersedes the resident's note.     REVIEW OF SYSTEMS:  Negative except in HPI.     PMH: HTN, and HLD    FHx: Reviewed. No fhx of asthma/copd, No fhx of liver and pulmonary disease. No fhx of hematological disorder.     Physical Exam:  GEN: No acute distress. Awake, Alert and oriented x 3.   Head: Atraumatic, Normocephalic.   Eye: PEERLA. No sclera icterus. EOMI.  B/L eyes conjunctivitis with purulent discharges/crusting.  ENT: Normal oropharynx, no thyromegaly, no mass, no lymphadenopathy.   LUNGS: Clear to auscultation bilaterally. No wheeze/rales/crackles.   HEART: Normal. S1/S2 present. RRR. No murmur/gallops.   ABD: Soft, non-tender, non-distended. Bowel sounds present.   EXT: No pitting edema. No erythema. No tenderness.  Integumentary: No rash, No sore, No petechia.   NEURO: CN III-XII intact. Strength: 5/5 b/l ULE. Sensory intact b/l ULE.     Vital Signs Last 24 Hrs  T(C): 37.7 (09 Dec 2023 16:00), Max: 39.8 (09 Dec 2023 14:25)  T(F): 99.8 (09 Dec 2023 16:00), Max: 103.7 (09 Dec 2023 14:25)  HR: 80 (09 Dec 2023 16:00) (71 - 84)  BP: 137/64 (09 Dec 2023 16:00) (133/61 - 171/70)  BP(mean): --  RR: 20 (09 Dec 2023 16:00) (17 - 20)  SpO2: 97% (09 Dec 2023 16:00) (94% - 98%)    Parameters below as of 09 Dec 2023 16:00  Patient On (Oxygen Delivery Method): room air      Please see the above notes for Labs and radiology.     Assessment and Plan:     82 yo M with hx of HTN, and HLD presents to ED after pre- syncopal episode.     Pre-Syncope likely 2/2 generalized weakness 2/2 influenza A   - CTH: neg for acute intracranial pathology.   - CTP: unremarkable.   - CTA H & N: No evidence of carotid or vertebral artery high-grade stenosis.  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  - EKG: NSR. Non ischemic.   - Troponin neg x 1. Check 2nd Troponin.   - check orthostatic VS  - s/p 2.5L LR bolus in ED. c/w gentle hydration   - fall precaution.     Fever likely 2/2 influenza A  - No WBC.   - CXR unremarkable to my read --> pending official report.   - will start in Tamiflu   - check UA and UCx, BCx and procalcitonin.   - s/p levaquin x 1 in ED --> monitor off abx for now.     B/L viral eye conjunctivitis with possible bacterial superimposed infection  - will start on erythromycin ointment q6h x 7 days.    Right Thyroid nodule  - CT shows 2.1 cm hypodense nodule in the right thyroid gland.   - Recommend outpatient follow up with thyroid US    HTN - hold anti-HTN medication.   HLD - c/w home med    DVT ppx: Lovenox SC  GI ppx: not indicated.   Diet: DASH diet  Activity: as tolerated.     Date seen by the attendin2023  Total time spent: 75 minutes.

## 2023-12-09 NOTE — ED ADULT NURSE REASSESSMENT NOTE - NS ED NURSE REASSESS COMMENT FT1
received report from prior RN< pt is alert and awake with family by bed side, no s/s of distress, vital stable, denies SOB/chest pain.

## 2023-12-09 NOTE — ED PROVIDER NOTE - PHYSICAL EXAMINATION
VITAL SIGNS: I have reviewed nursing notes and confirm.  CONSTITUTIONAL: Well-developed; well-nourished; in no acute distress.  SKIN: Skin exam is warm and dry, no acute rash.  HEAD: Normocephalic; atraumatic.  EYES: PERRL, EOM intact; conjunctiva and sclera clear.  ENT: mmm  CARD: S1, S2 normal; no murmurs, gallops, or rubs. Regular rate and rhythm.  RESP: Normal respiratory effort, no tachypnea or distress. coarse lung sounds b/l  ABD: soft, NT/ND.  EXT: Normal ROM. No clubbing, cyanosis or edema.  Neuro: A&Ox3, normal speech, CN II-XII intact, FROM & strength 5/5 x4 extremities. Sensation intact and equal. no pronator drift, no dysmetria on finger to nose b/l.   PSYCH: Cooperative, appropriate.

## 2023-12-09 NOTE — ED ADULT NURSE NOTE - CHIEF COMPLAINT QUOTE
[facility] s[facility]p syncopal episode  [facility] & syncopized to ground, dropping whopper on floor  EMS was called & pt with AMS attempting to operate vehicle, keys were obtained from pt & sent to ED  stroke code activated,    also with b/l eye redness & drainage [facility] syncopal episode  [facility] & syncopized to ground, dropping whopper on floor  EMS was called & pt with AMS attempting to operate vehicle, keys were obtained from pt & sent to ED  stroke code activated,    also with b/l eye redness & drainage

## 2023-12-09 NOTE — ED PROVIDER NOTE - ATTENDING CONTRIBUTION TO CARE
I personally evaluated patient. I agree with the findings and plan with all documentation on chart except as documented  in my note.    83-year-old male past medical history of hypertension, dyslipidemia, BPH who presents to the emergency department with weakness and after near syncopal event today.  Patient did not feel well today and went to urgent care and wanted to stop at [facility] on the way home to get his child food and while walking to the car he felt very weak and slid down and almost passed out.  Patient was initially confused and not making sense and was brought to the emergency department after someone thought that he was intoxicated or on drugs.  Stroke code was activated in triage.  Patient had a nonfocal neurological exam and was later found to be febrile and concern for sepsis.  Patient went for emergent head CT and neuroimaging that did not show any significant findings on perfusion scan or head CT.  Patient does have moderate stenosis of P1 segment of right PCA, which does not match any symptoms patient is having.  Appropriate cultures and sepsis labs sent and patient has a white blood cell count of 5000.  Patient found to have viral sepsis with influenza A positive.    ED work up reviewed and results and plan of care discussed with patient. Patient requires admission for further work up, monitoring, and management. Need for admission discussed with patient. I personally evaluated patient. I agree with the findings and plan with all documentation on chart except as documented  in my note.    83-year-old male past medical history of hypertension, dyslipidemia, BPH who presents to the emergency department with weakness and after near syncopal event today.  Patient did not feel well today and went to urgent care and wanted to stop at [facility] on the way home to get his child food and while walking to the car he felt very weak and slid down and almost passed out.  Patient was initially confused and not making sense and was brought to the emergency department after someone thought that he was intoxicated or on drugs.  Stroke code was activated in triage.  Patient had a nonfocal neurological exam and was later found to be febrile and concern for sepsis.  Patient went for emergent head CT and neuroimaging that did not show any significant findings on perfusion scan or head CT.  Patient does have moderate stenosis of P1 segment of right PCA, which does not match any symptoms patient is having.  Appropriate cultures and sepsis labs sent and patient has a white blood cell count of 5000.  Patient found to have viral sepsis with influenza A positive.    ED work up reviewed and results and plan of care discussed with patient. Patient requires admission for further work up, monitoring, and management. Need for admission discussed with patient.

## 2023-12-09 NOTE — H&P ADULT - NSHPREVIEWOFSYSTEMS_GEN_ALL_CORE
REVIEW OF SYSTEMS:    CONSTITUTIONAL: per hpi   EYES/ENT:  No visual changes  NECK:  No pain or stiffness  RESPIRATORY: currently neg   CARDIOVASCULAR:  neg   GASTROINTESTINAL:neg   GENITOURINARY:  neg   NEUROLOGICAL:per hpi   SKIN:  No itching, rashes

## 2023-12-09 NOTE — ED PROVIDER NOTE - PROGRESS NOTE DETAILS
Sepsis suspected at this time. Will send appropriate labs and cultures. 30 cc/kg fluid bolus given based on ideal body weight. Will give broad spectrum antibiotics after blood cultures are drawn. Will follow up initial lactate and repeat after fluids if lactate > 2. Repeat sepsis perfusion exam performed. Patient has warm skin with strong pulses and good capillary refill. Lungs are clear post IV fluid resuscitation..

## 2023-12-09 NOTE — H&P ADULT - NSHPLABSRESULTS_GEN_ALL_CORE
12.3   5.21  )-----------( 211      ( 09 Dec 2023 14:05 )             38.1       12-09    133<L>  |  98  |  18  ----------------------------<  120<H>  4.6   |  23  |  1.2    Ca    8.5      09 Dec 2023 14:05    TPro  6.7  /  Alb  4.3  /  TBili  0.4  /  DBili  x   /  AST  28  /  ALT  16  /  AlkPhos  70  12-09              PT/INR - ( 09 Dec 2023 14:05 )   PT: 12.60 sec;   INR: 1.10 ratio         PTT - ( 09 Dec 2023 14:05 )  PTT:27.2 sec

## 2023-12-09 NOTE — ED PROVIDER NOTE - CLINICAL SUMMARY MEDICAL DECISION MAKING FREE TEXT BOX
83-year-old male past medical history of hypertension, dyslipidemia, BPH who presents to the emergency department with weakness and after near syncopal event today.  Patient did not feel well today and went to urgent care and wanted to stop at [employer] on the way home to get his child food and while walking to the car he felt very weak and slid down and almost passed out.  Patient was initially confused and not making sense and was brought to the emergency department after someone thought that he was intoxicated or on drugs.  Stroke code was activated in triage.  Patient had a nonfocal neurological exam and was later found to be febrile and concern for sepsis.  Patient went for emergent head CT and neuroimaging that did not show any significant findings on perfusion scan or head CT.  Patient does have moderate stenosis of P1 segment of right PCA, which does not match any symptoms patient is having.  Appropriate cultures and sepsis labs sent and patient has a white blood cell count of 5000.  Patient found to have viral sepsis with influenza A positive.    ED work up reviewed and results and plan of care discussed with patient. Patient requires admission for further work up, monitoring, and management. Need for admission discussed with patient. 83-year-old male past medical history of hypertension, dyslipidemia, BPH who presents to the emergency department with weakness and after near syncopal event today.  Patient did not feel well today and went to urgent care and wanted to stop at [facility] on the way home to get his child food and while walking to the car he felt very weak and slid down and almost passed out.  Patient was initially confused and not making sense and was brought to the emergency department after someone thought that he was intoxicated or on drugs.  Stroke code was activated in triage.  Patient had a nonfocal neurological exam and was later found to be febrile and concern for sepsis.  Patient went for emergent head CT and neuroimaging that did not show any significant findings on perfusion scan or head CT.  Patient does have moderate stenosis of P1 segment of right PCA, which does not match any symptoms patient is having.  Appropriate cultures and sepsis labs sent and patient has a white blood cell count of 5000.  Patient found to have viral sepsis with influenza A positive.    ED work up reviewed and results and plan of care discussed with patient. Patient requires admission for further work up, monitoring, and management. Need for admission discussed with patient.

## 2023-12-09 NOTE — ED ADULT TRIAGE NOTE - DATE/TIME OF ACCEPTANCE
09-Dec-2023 13:47 General: irritable, but no excessive crying  HEENT: No congestion, no rhinorrhea, no conjunctival injection  Respiratory: No cough, no shortness of breath  Cardiac: No cyanosis  GI: No diarrhea, no vomiting, no constipation  : No hematuria, decreased wet diapers  MSK: No swelling in extremities  Neuro: No abnormal movements

## 2023-12-09 NOTE — H&P ADULT - NSHPPHYSICALEXAM_GEN_ALL_CORE
LOS:     VITALS:   T(C): 37.7 (12-09-23 @ 16:00), Max: 39.8 (12-09-23 @ 14:25)  HR: 80 (12-09-23 @ 16:00) (71 - 84)  BP: 137/64 (12-09-23 @ 16:00) (133/61 - 171/70)  RR: 20 (12-09-23 @ 16:00) (17 - 20)  SpO2: 97% (12-09-23 @ 16:00) (94% - 98%)    GENERAL: NAD, lying in bed comfortably  HEAD:  Atraumatic, Normocephalic  EYES: ocular discharge   ENT: Moist mucous membranes  NECK: Supple  CHEST/LUNG: Clear to auscultation bilaterally; No rales, rhonchi, wheezing, or rubs. Unlabored respirations  HEART: Regular rate and rhythm; No murmurs, rubs, or gallops  ABDOMEN: BSx4; Soft, nontender, nondistended  EXTREMITIES: no edema   NERVOUS SYSTEM:  A&O3   SKIN: No rashes or lesions

## 2023-12-09 NOTE — H&P ADULT - ASSESSMENT
83 M PMH of HTN, HLD presented to ED after syncopal episode of barking.  Patient said he generally felt unwell today so was seen at [facility] but does not know what happened there.  Went to [facility] after and had a presyncopal episode so EMS was called.  Here patient states he feels generally weak and unwell but has no specific complaints at this time.  Notes dry cough but denies vomiting, diarrhea and has no complaints of pain at this time.    #sepsis poa 2/2 influenza a  - started on Tamiflu  - f/u cxr official read (negative on my read)  - f/u procal  - f/u full rvp panel  - f/u cultures    83 M PMH of HTN, HLD presented to ED after syncopal episode of barking.  Patient said he generally felt unwell today so was seen at [facility] but does not know what happened there.  Went to [facility] after and had a presyncopal episode so EMS was called.  Here patient states he feels generally weak and unwell but has no specific complaints at this time.  Notes dry cough but denies vomiting, diarrhea and has no complaints of pain at this time.    #sepsis poa 2/2 influenza a  - started on Tamiflu  - f/u cxr official read (negative on my read)  - f/u procal  - f/u full rvp panel  - f/u cultures    83 M PMH of HTN, HLD, enlarged prostate presented to ED after syncopal episode. patient today was feeling weakness and dizziness went to urgent care, after urgent care went to [facility]. while there he had syncope episode and was brought to ED by ems. was stroke code in ED, work up negative. reports not having any symptoms after tylenol. tested positive for flu a.     #sepsis poa 2/2 influenza a  #syncope likely due to influenza as well  - started on Tamiflu  - f/u cxr official read (negative on my read)  - f/u procal  - f/u full rvp panel  - f/u a1c  - f/u cultures   - f/u orthostatics     #2.1 cm hypodense nodule in right thyroid gland  -thyroid us outpatient     #med rec: patient and wife unsure of med rec. pls call [facility] in [place] in am. ( try [phone]). wife reports he takes meds for htn, enlarged prostate and gout. for now will start     Diet: dash  DVT PPX: loveonx   GI PPX: protonix 83 M PMH of HTN, HLD, enlarged prostate presented to ED after syncopal episode. patient today was feeling weakness and dizziness went to urgent care, after urgent care went to [facility]. while there he had syncope episode and was brought to ED by ems. was stroke code in ED, work up negative. reports not having any symptoms after tylenol. tested positive for flu a.     #sepsis poa 2/2 influenza a  #syncope likely due to influenza as well  - started on Tamiflu  - f/u cxr official read (negative on my read)  - f/u procal  - f/u full rvp panel  - f/u a1c  - f/u cultures   - f/u orthostatics     #2.1 cm hypodense nodule in right thyroid gland  -thyroid us outpatient     #med rec: patient and wife unsure of med rec. pls call [facility] in [place] in am. ( try [phone]). wife reports he takes meds for htn, enlarged prostate and gout. for now will start     Diet: dash  DVT PPX: loveonx   GI PPX: protonix

## 2023-12-10 LAB
ALBUMIN SERPL ELPH-MCNC: 4 G/DL — SIGNIFICANT CHANGE UP (ref 3.5–5.2)
ALBUMIN SERPL ELPH-MCNC: 4 G/DL — SIGNIFICANT CHANGE UP (ref 3.5–5.2)
ALP SERPL-CCNC: 59 U/L — SIGNIFICANT CHANGE UP (ref 30–115)
ALP SERPL-CCNC: 59 U/L — SIGNIFICANT CHANGE UP (ref 30–115)
ALT FLD-CCNC: 17 U/L — SIGNIFICANT CHANGE UP (ref 0–41)
ALT FLD-CCNC: 17 U/L — SIGNIFICANT CHANGE UP (ref 0–41)
ANION GAP SERPL CALC-SCNC: 12 MMOL/L — SIGNIFICANT CHANGE UP (ref 7–14)
ANION GAP SERPL CALC-SCNC: 12 MMOL/L — SIGNIFICANT CHANGE UP (ref 7–14)
APPEARANCE UR: CLEAR — SIGNIFICANT CHANGE UP
APPEARANCE UR: CLEAR — SIGNIFICANT CHANGE UP
AST SERPL-CCNC: 43 U/L — HIGH (ref 0–41)
AST SERPL-CCNC: 43 U/L — HIGH (ref 0–41)
BACTERIA # UR AUTO: NEGATIVE /HPF — SIGNIFICANT CHANGE UP
BACTERIA # UR AUTO: NEGATIVE /HPF — SIGNIFICANT CHANGE UP
BASOPHILS # BLD AUTO: 0.02 K/UL — SIGNIFICANT CHANGE UP (ref 0–0.2)
BASOPHILS # BLD AUTO: 0.02 K/UL — SIGNIFICANT CHANGE UP (ref 0–0.2)
BASOPHILS NFR BLD AUTO: 0.6 % — SIGNIFICANT CHANGE UP (ref 0–1)
BASOPHILS NFR BLD AUTO: 0.6 % — SIGNIFICANT CHANGE UP (ref 0–1)
BILIRUB SERPL-MCNC: 0.4 MG/DL — SIGNIFICANT CHANGE UP (ref 0.2–1.2)
BILIRUB SERPL-MCNC: 0.4 MG/DL — SIGNIFICANT CHANGE UP (ref 0.2–1.2)
BILIRUB UR-MCNC: NEGATIVE — SIGNIFICANT CHANGE UP
BILIRUB UR-MCNC: NEGATIVE — SIGNIFICANT CHANGE UP
BUN SERPL-MCNC: 17 MG/DL — SIGNIFICANT CHANGE UP (ref 10–20)
BUN SERPL-MCNC: 17 MG/DL — SIGNIFICANT CHANGE UP (ref 10–20)
CALCIUM SERPL-MCNC: 8.2 MG/DL — LOW (ref 8.4–10.4)
CALCIUM SERPL-MCNC: 8.2 MG/DL — LOW (ref 8.4–10.4)
CAST: 0 /LPF — SIGNIFICANT CHANGE UP (ref 0–4)
CAST: 0 /LPF — SIGNIFICANT CHANGE UP (ref 0–4)
CHLORIDE SERPL-SCNC: 104 MMOL/L — SIGNIFICANT CHANGE UP (ref 98–110)
CHLORIDE SERPL-SCNC: 104 MMOL/L — SIGNIFICANT CHANGE UP (ref 98–110)
CO2 SERPL-SCNC: 22 MMOL/L — SIGNIFICANT CHANGE UP (ref 17–32)
CO2 SERPL-SCNC: 22 MMOL/L — SIGNIFICANT CHANGE UP (ref 17–32)
COLOR SPEC: YELLOW — SIGNIFICANT CHANGE UP
COLOR SPEC: YELLOW — SIGNIFICANT CHANGE UP
CREAT SERPL-MCNC: 1.1 MG/DL — SIGNIFICANT CHANGE UP (ref 0.7–1.5)
CREAT SERPL-MCNC: 1.1 MG/DL — SIGNIFICANT CHANGE UP (ref 0.7–1.5)
CULTURE RESULTS: SIGNIFICANT CHANGE UP
CULTURE RESULTS: SIGNIFICANT CHANGE UP
DIFF PNL FLD: ABNORMAL
DIFF PNL FLD: ABNORMAL
EGFR: 67 ML/MIN/1.73M2 — SIGNIFICANT CHANGE UP
EGFR: 67 ML/MIN/1.73M2 — SIGNIFICANT CHANGE UP
EOSINOPHIL # BLD AUTO: 0.06 K/UL — SIGNIFICANT CHANGE UP (ref 0–0.7)
EOSINOPHIL # BLD AUTO: 0.06 K/UL — SIGNIFICANT CHANGE UP (ref 0–0.7)
EOSINOPHIL NFR BLD AUTO: 1.7 % — SIGNIFICANT CHANGE UP (ref 0–8)
EOSINOPHIL NFR BLD AUTO: 1.7 % — SIGNIFICANT CHANGE UP (ref 0–8)
GLUCOSE SERPL-MCNC: 91 MG/DL — SIGNIFICANT CHANGE UP (ref 70–99)
GLUCOSE SERPL-MCNC: 91 MG/DL — SIGNIFICANT CHANGE UP (ref 70–99)
GLUCOSE UR QL: NEGATIVE MG/DL — SIGNIFICANT CHANGE UP
GLUCOSE UR QL: NEGATIVE MG/DL — SIGNIFICANT CHANGE UP
HCT VFR BLD CALC: 39.8 % — LOW (ref 42–52)
HCT VFR BLD CALC: 39.8 % — LOW (ref 42–52)
HGB BLD-MCNC: 12.4 G/DL — LOW (ref 14–18)
HGB BLD-MCNC: 12.4 G/DL — LOW (ref 14–18)
IMM GRANULOCYTES NFR BLD AUTO: 0.6 % — HIGH (ref 0.1–0.3)
IMM GRANULOCYTES NFR BLD AUTO: 0.6 % — HIGH (ref 0.1–0.3)
KETONES UR-MCNC: ABNORMAL MG/DL
KETONES UR-MCNC: ABNORMAL MG/DL
LEUKOCYTE ESTERASE UR-ACNC: NEGATIVE — SIGNIFICANT CHANGE UP
LEUKOCYTE ESTERASE UR-ACNC: NEGATIVE — SIGNIFICANT CHANGE UP
LYMPHOCYTES # BLD AUTO: 0.73 K/UL — LOW (ref 1.2–3.4)
LYMPHOCYTES # BLD AUTO: 0.73 K/UL — LOW (ref 1.2–3.4)
LYMPHOCYTES # BLD AUTO: 20.7 % — SIGNIFICANT CHANGE UP (ref 20.5–51.1)
LYMPHOCYTES # BLD AUTO: 20.7 % — SIGNIFICANT CHANGE UP (ref 20.5–51.1)
MAGNESIUM SERPL-MCNC: 1.9 MG/DL — SIGNIFICANT CHANGE UP (ref 1.8–2.4)
MAGNESIUM SERPL-MCNC: 1.9 MG/DL — SIGNIFICANT CHANGE UP (ref 1.8–2.4)
MCHC RBC-ENTMCNC: 21.8 PG — LOW (ref 27–31)
MCHC RBC-ENTMCNC: 21.8 PG — LOW (ref 27–31)
MCHC RBC-ENTMCNC: 31.2 G/DL — LOW (ref 32–37)
MCHC RBC-ENTMCNC: 31.2 G/DL — LOW (ref 32–37)
MCV RBC AUTO: 69.8 FL — LOW (ref 80–94)
MCV RBC AUTO: 69.8 FL — LOW (ref 80–94)
MONOCYTES # BLD AUTO: 0.64 K/UL — HIGH (ref 0.1–0.6)
MONOCYTES # BLD AUTO: 0.64 K/UL — HIGH (ref 0.1–0.6)
MONOCYTES NFR BLD AUTO: 18.1 % — HIGH (ref 1.7–9.3)
MONOCYTES NFR BLD AUTO: 18.1 % — HIGH (ref 1.7–9.3)
NEUTROPHILS # BLD AUTO: 2.06 K/UL — SIGNIFICANT CHANGE UP (ref 1.4–6.5)
NEUTROPHILS # BLD AUTO: 2.06 K/UL — SIGNIFICANT CHANGE UP (ref 1.4–6.5)
NEUTROPHILS NFR BLD AUTO: 58.3 % — SIGNIFICANT CHANGE UP (ref 42.2–75.2)
NEUTROPHILS NFR BLD AUTO: 58.3 % — SIGNIFICANT CHANGE UP (ref 42.2–75.2)
NITRITE UR-MCNC: NEGATIVE — SIGNIFICANT CHANGE UP
NITRITE UR-MCNC: NEGATIVE — SIGNIFICANT CHANGE UP
NRBC # BLD: 0 /100 WBCS — SIGNIFICANT CHANGE UP (ref 0–0)
NRBC # BLD: 0 /100 WBCS — SIGNIFICANT CHANGE UP (ref 0–0)
PH UR: 6.5 — SIGNIFICANT CHANGE UP (ref 5–8)
PH UR: 6.5 — SIGNIFICANT CHANGE UP (ref 5–8)
PLATELET # BLD AUTO: 174 K/UL — SIGNIFICANT CHANGE UP (ref 130–400)
PLATELET # BLD AUTO: 174 K/UL — SIGNIFICANT CHANGE UP (ref 130–400)
PMV BLD: 10.5 FL — HIGH (ref 7.4–10.4)
PMV BLD: 10.5 FL — HIGH (ref 7.4–10.4)
POTASSIUM SERPL-MCNC: 4.1 MMOL/L — SIGNIFICANT CHANGE UP (ref 3.5–5)
POTASSIUM SERPL-MCNC: 4.1 MMOL/L — SIGNIFICANT CHANGE UP (ref 3.5–5)
POTASSIUM SERPL-SCNC: 4.1 MMOL/L — SIGNIFICANT CHANGE UP (ref 3.5–5)
POTASSIUM SERPL-SCNC: 4.1 MMOL/L — SIGNIFICANT CHANGE UP (ref 3.5–5)
PROT SERPL-MCNC: 5.9 G/DL — LOW (ref 6–8)
PROT SERPL-MCNC: 5.9 G/DL — LOW (ref 6–8)
PROT UR-MCNC: SIGNIFICANT CHANGE UP MG/DL
PROT UR-MCNC: SIGNIFICANT CHANGE UP MG/DL
RBC # BLD: 5.7 M/UL — SIGNIFICANT CHANGE UP (ref 4.7–6.1)
RBC # BLD: 5.7 M/UL — SIGNIFICANT CHANGE UP (ref 4.7–6.1)
RBC # FLD: 17.8 % — HIGH (ref 11.5–14.5)
RBC # FLD: 17.8 % — HIGH (ref 11.5–14.5)
RBC CASTS # UR COMP ASSIST: 1 /HPF — SIGNIFICANT CHANGE UP (ref 0–4)
RBC CASTS # UR COMP ASSIST: 1 /HPF — SIGNIFICANT CHANGE UP (ref 0–4)
SODIUM SERPL-SCNC: 138 MMOL/L — SIGNIFICANT CHANGE UP (ref 135–146)
SODIUM SERPL-SCNC: 138 MMOL/L — SIGNIFICANT CHANGE UP (ref 135–146)
SP GR SPEC: 1.02 — SIGNIFICANT CHANGE UP (ref 1–1.03)
SP GR SPEC: 1.02 — SIGNIFICANT CHANGE UP (ref 1–1.03)
SPECIMEN SOURCE: SIGNIFICANT CHANGE UP
SPECIMEN SOURCE: SIGNIFICANT CHANGE UP
SQUAMOUS # UR AUTO: 0 /HPF — SIGNIFICANT CHANGE UP (ref 0–5)
SQUAMOUS # UR AUTO: 0 /HPF — SIGNIFICANT CHANGE UP (ref 0–5)
UROBILINOGEN FLD QL: 1 MG/DL — SIGNIFICANT CHANGE UP (ref 0.2–1)
UROBILINOGEN FLD QL: 1 MG/DL — SIGNIFICANT CHANGE UP (ref 0.2–1)
WBC # BLD: 3.53 K/UL — LOW (ref 4.8–10.8)
WBC # BLD: 3.53 K/UL — LOW (ref 4.8–10.8)
WBC # FLD AUTO: 3.53 K/UL — LOW (ref 4.8–10.8)
WBC # FLD AUTO: 3.53 K/UL — LOW (ref 4.8–10.8)
WBC UR QL: 0 /HPF — SIGNIFICANT CHANGE UP (ref 0–5)
WBC UR QL: 0 /HPF — SIGNIFICANT CHANGE UP (ref 0–5)

## 2023-12-10 PROCEDURE — 99232 SBSQ HOSP IP/OBS MODERATE 35: CPT

## 2023-12-10 RX ADMIN — Medication 1 APPLICATION(S): at 11:34

## 2023-12-10 RX ADMIN — Medication 1 APPLICATION(S): at 00:47

## 2023-12-10 RX ADMIN — Medication 1 APPLICATION(S): at 06:03

## 2023-12-10 RX ADMIN — Medication 1 APPLICATION(S): at 17:27

## 2023-12-10 RX ADMIN — PANTOPRAZOLE SODIUM 40 MILLIGRAM(S): 20 TABLET, DELAYED RELEASE ORAL at 06:04

## 2023-12-10 RX ADMIN — ENOXAPARIN SODIUM 40 MILLIGRAM(S): 100 INJECTION SUBCUTANEOUS at 11:34

## 2023-12-10 RX ADMIN — SODIUM CHLORIDE 50 MILLILITER(S): 9 INJECTION INTRAMUSCULAR; INTRAVENOUS; SUBCUTANEOUS at 00:50

## 2023-12-10 RX ADMIN — Medication 75 MILLIGRAM(S): at 06:03

## 2023-12-10 RX ADMIN — TAMSULOSIN HYDROCHLORIDE 0.4 MILLIGRAM(S): 0.4 CAPSULE ORAL at 21:22

## 2023-12-10 RX ADMIN — Medication 75 MILLIGRAM(S): at 17:27

## 2023-12-10 NOTE — PROGRESS NOTE ADULT - ASSESSMENT
82 yo M with hx of HTN, and HLD presents to ED after pre- syncopal episode.     #Pre-Syncope and generalized weakness   #Sepsis, POA  #Influenza A  CTH: neg for acute intracranial pathology.   CTP: unremarkable.   CTA H & N: No evidence of carotid or vertebral artery high-grade stenosis.  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  EKG: NSR. Non ischemic.   CXR clear on my read  - check orthostatic VS  - s/p 2.5L LR bolus in ED. c/w gentle hydration   - fall precaution.   - Cont tamiflu  - Monitor off antibiotics  - PT Eval  - ADP 24h    #B/L viral eye conjunctivitis with possible bacterial superimposed infection  - will start on erythromycin ointment q6h x 7 days.    #Right Thyroid nodule  - CT shows 2.1 cm hypodense nodule in the right thyroid gland.   - Recommend outpatient follow up with thyroid US    #HTN - hold anti-HTN medication.   #HLD - c/w home med    DVT ppx: Lovenox SC    #Progress Note Handoff  Pending (specify): Cont tamiflu, BCx, PT Eval, ADP 24  Family discussion: kathe pt regarding tx for flu  Disposition: Home

## 2023-12-10 NOTE — PROGRESS NOTE ADULT - SUBJECTIVE AND OBJECTIVE BOX
DAVID JOHN  83y, Male  Allergy: No Known Allergies    Hospital Day: 1d    Patient seen and examined. No acute events overnight    PMH/PSH:  PAST MEDICAL & SURGICAL HISTORY:      VITALS:  T(F): 98.1 (12-10-23 @ 06:18), Max: 103.7 (12-09-23 @ 14:25)  HR: 72 (12-10-23 @ 06:18)  BP: 141/73 (12-10-23 @ 06:18) (133/61 - 171/70)  RR: 18 (12-10-23 @ 06:18)  SpO2: 95% (12-10-23 @ 06:18)    TESTS & MEASUREMENTS:  Weight (Kg): 63.503 (12-09-23 @ 14:30)                            12.4   3.53  )-----------( 174      ( 10 Dec 2023 07:59 )             39.8     PT/INR - ( 09 Dec 2023 14:05 )   PT: 12.60 sec;   INR: 1.10 ratio         PTT - ( 09 Dec 2023 14:05 )  PTT:27.2 sec  12-10    138  |  104  |  17  ----------------------------<  91  4.1   |  22  |  1.1    Ca    8.2<L>      10 Dec 2023 07:59  Mg     1.9     12-10    TPro  5.9<L>  /  Alb  4.0  /  TBili  0.4  /  DBili  x   /  AST  43<H>  /  ALT  17  /  AlkPhos  59  12-10    LIVER FUNCTIONS - ( 10 Dec 2023 07:59 )  Alb: 4.0 g/dL / Pro: 5.9 g/dL / ALK PHOS: 59 U/L / ALT: 17 U/L / AST: 43 U/L / GGT: x           CARDIAC MARKERS ( 09 Dec 2023 14:05 )  x     / x     / 153 U/L / x     / x      CARDIAC MARKERS ( 09 Dec 2023 14:05 )  x     / <0.01 ng/mL / x     / x     / 1.5 ng/mL        Urinalysis Basic - ( 10 Dec 2023 07:59 )    Color: x / Appearance: x / SG: x / pH: x  Gluc: 91 mg/dL / Ketone: x  / Bili: x / Urobili: x   Blood: x / Protein: x / Nitrite: x   Leuk Esterase: x / RBC: x / WBC x   Sq Epi: x / Non Sq Epi: x / Bacteria: x        RADIOLOGY & ADDITIONAL TESTS:    RECENT DIAGNOSTIC ORDERS:  Diet, DASH/TLC:   Sodium & Cholesterol Restricted (12-09-23 @ 21:58)  A1C with Estimated Average Glucose: AM Sched. Collection: 10-Dec-2023 04:30 (12-09-23 @ 20:51)  Procalcitonin, Serum: AM Sched. Collection: 10-Dec-2023 04:30 (12-09-23 @ 20:51)  Respiratory Viral Panel with COVID-19 by LEVI: Routine (12-09-23 @ 20:51)  Culture - Urine: Routine  Specimen Source: Clean Catch (Midstream)  Addl Info: If indwelling urinary catheter > 14 days, obtain an order to remove and replace prior to c (12-09-23 @ 20:42)  Urinalysis: STAT (12-09-23 @ 20:42)  Blood Gas Profile w/Lytes - Venous: STAT (12-09-23 @ 14:35)  Culture - Urine: Routine  Specimen Source: Clean Catch (Midstream)  Addl Info: If indwelling urinary catheter > 14 days, obtain an order to remove and replace prior to c (12-09-23 @ 14:30)  Culture - Blood: STAT  Specimen Source: Blood (12-09-23 @ 14:30)  Culture - Blood: STAT  Specimen Source: Blood (12-09-23 @ 14:30)      MEDICATIONS:  MEDICATIONS  (STANDING):  enoxaparin Injectable 40 milliGRAM(s) SubCutaneous every 24 hours  erythromycin   Ointment 1 Application(s) Both EYES every 6 hours  oseltamivir 75 milliGRAM(s) Oral two times a day  pantoprazole    Tablet 40 milliGRAM(s) Oral before breakfast  sodium chloride 0.9%. 1000 milliLiter(s) (50 mL/Hr) IV Continuous <Continuous>  tamsulosin 0.4 milliGRAM(s) Oral at bedtime    MEDICATIONS  (PRN):      HOME MEDICATIONS:      REVIEW OF SYSTEMS:  All other review of systems is negative unless indicated above.     PHYSICAL EXAM:  PHYSICAL EXAM:  GENERAL: NAD  HEAD:  Atraumatic, Normocephalic  NECK: Supple, No JVD  CHEST/LUNG: Clear to auscultation bilaterally; No wheeze  HEART: Regular rate and rhythm; No murmurs, rubs, or gallops  ABDOMEN: Soft, Nontender, Nondistended; Bowel sounds present  EXTREMITIES:  2+ Peripheral Pulses, No clubbing, cyanosis, or edema  SKIN: No rashes or lesions

## 2023-12-10 NOTE — ED ADULT NURSE REASSESSMENT NOTE - NS ED NURSE REASSESS COMMENT FT1
Pt received from PM RN. Pt alert, breathing unlabored and spont on RA, MAEx4. Pt pending bed assignment. Isolation maintained

## 2023-12-11 ENCOUNTER — TRANSCRIPTION ENCOUNTER (OUTPATIENT)
Age: 83
End: 2023-12-11

## 2023-12-11 VITALS
OXYGEN SATURATION: 96 % | DIASTOLIC BLOOD PRESSURE: 79 MMHG | SYSTOLIC BLOOD PRESSURE: 176 MMHG | RESPIRATION RATE: 18 BRPM | TEMPERATURE: 97 F | HEART RATE: 70 BPM

## 2023-12-11 LAB
A1C WITH ESTIMATED AVERAGE GLUCOSE RESULT: 6.2 % — HIGH (ref 4–5.6)
A1C WITH ESTIMATED AVERAGE GLUCOSE RESULT: 6.2 % — HIGH (ref 4–5.6)
CULTURE RESULTS: NO GROWTH — SIGNIFICANT CHANGE UP
CULTURE RESULTS: NO GROWTH — SIGNIFICANT CHANGE UP
ESTIMATED AVERAGE GLUCOSE: 131 MG/DL — HIGH (ref 68–114)
ESTIMATED AVERAGE GLUCOSE: 131 MG/DL — HIGH (ref 68–114)
PROCALCITONIN SERPL-MCNC: 0.1 NG/ML — SIGNIFICANT CHANGE UP (ref 0.02–0.1)
PROCALCITONIN SERPL-MCNC: 0.1 NG/ML — SIGNIFICANT CHANGE UP (ref 0.02–0.1)
SPECIMEN SOURCE: SIGNIFICANT CHANGE UP
SPECIMEN SOURCE: SIGNIFICANT CHANGE UP

## 2023-12-11 PROCEDURE — 99239 HOSP IP/OBS DSCHRG MGMT >30: CPT

## 2023-12-11 RX ORDER — TAMSULOSIN HYDROCHLORIDE 0.4 MG/1
1 CAPSULE ORAL
Qty: 0 | Refills: 0 | DISCHARGE
Start: 2023-12-11

## 2023-12-11 RX ORDER — ERYTHROMYCIN BASE 5 MG/GRAM
1 OINTMENT (GRAM) OPHTHALMIC (EYE)
Qty: 1 | Refills: 0
Start: 2023-12-11 | End: 2023-12-13

## 2023-12-11 RX ADMIN — Medication 1 APPLICATION(S): at 05:04

## 2023-12-11 RX ADMIN — Medication 75 MILLIGRAM(S): at 05:03

## 2023-12-11 RX ADMIN — Medication 1 APPLICATION(S): at 00:57

## 2023-12-11 RX ADMIN — PANTOPRAZOLE SODIUM 40 MILLIGRAM(S): 20 TABLET, DELAYED RELEASE ORAL at 05:55

## 2023-12-11 NOTE — DISCHARGE NOTE PROVIDER - HOSPITAL COURSE
83 M PMH of HTN, HLD, enlarged prostate presented to ED after pre-syncopal episode. patient today was feeling weakness and dizziness went to urgent care, after urgent care went to [facility], while he was there, had pre-syncope episode so EMS was called. Patient reports that he was walking to the car and was feeling very weak, so he tried to hold onto something and then slide down. Denied LOC. Denied head trauma.  s/p stroke code in ED, work up negative. reports not having any symptoms after tylenol. tested positive for flu a.     temp 103.7, hr 71, bp 136/76, rr 20 sat 97 on RA  wbc 5.21, hg 12.3, mcv 70, neutro % 80.2, reactive lymphocytes 5.2, lactate 1.2, na 133, trop neg. influenza a positive     < from: 12 Lead ECG (12.09.23 @ 14:32) >  Diagnosis Line Normal sinus rhythm  Cannot rule out Anterior infarct , age undetermined    < end of copied text >    CT PERFUSION:    No evidence of perfusion abnormality.    CTA HEAD/NECK:    No evidence of carotid or vertebral artery high-grade stenosis.    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    2.1 cm hypodense nodule in the right thyroid gland. A thyroid sonogram   can be obtained for further evaluation.  _____________  NASCET criteria for internal carotid artery stenosis:  Mild: 0% to 49%  Moderate: 50% to 69%  Severe: 70% to 99%  Complete Occlusion    < end of copied text >  < from: CT Brain Stroke Protocol (12.09.23 @ 13:57) >  No CT evidence for acute intracranial hemorrhage or large territorial   infarct.    < end of copied text >    CXR neg my read     #Pre-Syncope and generalized weakness   #Sepsis, POA  #Influenza A  CTH: neg for acute intracranial pathology.   CTP: unremarkable.   CTA H & N: No evidence of carotid or vertebral artery high-grade stenosis.  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  EKG: NSR. Non ischemic.   CXR clear on my read  - check orthostatic VS  - s/p 2.5L LR bolus in ED. c/w gentle hydration   - fall precaution.   - Cont tamiflu  - Monitor off antibiotics  - PT Eval  - ADP 24h    #B/L viral eye conjunctivitis with possible bacterial superimposed infection  - will start on erythromycin ointment q6h x 7 days.    #Right Thyroid nodule  - CT shows 2.1 cm hypodense nodule in the right thyroid gland.   - Recommend outpatient follow up with thyroid US    #HTN - hold anti-HTN medication.   #HLD - c/w home med    DVT ppx: Lovenox SC    #Progress Note Handoff  Pending (specify): Cont tamiflu, BCx, PT Eval, ADP 24  Family discussion: kathe pt regarding tx for flu  Disposition: Home 83 M PMH of HTN, HLD, enlarged prostate presented to ED after pre-syncopal episode. patient today was feeling weakness and dizziness went to urgent care, after urgent care went to [facility], while he was there, had pre-syncope episode so EMS was called. Patient reports that he was walking to the car and was feeling very weak, so he tried to hold onto something and then slide down. Denied LOC. Denied head trauma.  s/p stroke code in ED, work up negative. reports not having any symptoms after tylenol. tested positive for flu a.     temp 103.7, hr 71, bp 136/76, rr 20 sat 97 on RA  wbc 5.21, hg 12.3, mcv 70, neutro % 80.2, reactive lymphocytes 5.2, lactate 1.2, na 133, trop neg. influenza a positive     < from: 12 Lead ECG (12.09.23 @ 14:32) >  Diagnosis Line Normal sinus rhythm  Cannot rule out Anterior infarct , age undetermined    < end of copied text >    CT PERFUSION:    No evidence of perfusion abnormality.    CTA HEAD/NECK:    No evidence of carotid or vertebral artery high-grade stenosis.    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    2.1 cm hypodense nodule in the right thyroid gland. A thyroid sonogram   can be obtained for further evaluation.  _____________  NASCET criteria for internal carotid artery stenosis:  Mild: 0% to 49%  Moderate: 50% to 69%  Severe: 70% to 99%  Complete Occlusion    < end of copied text >  < from: CT Brain Stroke Protocol (12.09.23 @ 13:57) >  No CT evidence for acute intracranial hemorrhage or large territorial   infarct.    < end of copied text >    CXR neg my read     #Pre-Syncope and generalized weakness   #Sepsis, POA  #Influenza A  CTH: neg for acute intracranial pathology.   CTP: unremarkable.   CTA H & N: No evidence of carotid or vertebral artery high-grade stenosis.  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  EKG: NSR. Non ischemic.   CXR clear on my read  - check orthostatic VS  - s/p 2.5L LR bolus in ED. c/w gentle hydration   - fall precaution.   - Cont tamiflu  - Monitor off antibiotics  - PT Eval  - ADP 24h    #B/L viral eye conjunctivitis with possible bacterial superimposed infection  - will start on erythromycin ointment q6h x 7 days.    #Right Thyroid nodule  - CT shows 2.1 cm hypodense nodule in the right thyroid gland.   - Recommend outpatient follow up with thyroid US    #HTN - hold anti-HTN medication.   #HLD - c/w home med    DVT ppx: Lovenox SC    #Progress Note Handoff  Pending (specify): Cont tamiflu, BCx, PT Eval, ADP 24  Family discussion: kathe pt regarding tx for flu  Disposition: Home

## 2023-12-11 NOTE — DISCHARGE NOTE PROVIDER - CARE PROVIDER_API CALL
Rickie Prakash  [address] [address]  Phone: [phone]  Fax: [phone]  Established Patient  Follow Up Time: 2 weeks   Rickie Prakash  [address] [address]  Phone: [phone]  Fax: [phone]  Established Patient  Follow Up Time: 2 weeks

## 2023-12-11 NOTE — DISCHARGE NOTE PROVIDER - CARE PROVIDERS DIRECT ADDRESSES
,[email or web address]

## 2023-12-11 NOTE — DISCHARGE NOTE PROVIDER - NSDCCPCAREPLAN_GEN_ALL_CORE_FT
PRINCIPAL DISCHARGE DIAGNOSIS  Diagnosis: Syncope  Assessment and Plan of Treatment: You came to the hospityal bc of a syncopal episode you were given fluids and flu medication and had your orthostatic blood presssure measured until you felt okay. Please continue to take your flu medication and follow up with your PCP in2 weeks after discharge.      SECONDARY DISCHARGE DIAGNOSES  Diagnosis: Viral conjunctivitis  Assessment and Plan of Treatment: You were given eye drops because of viral infection in your eye. Continue to take the eye drops as recommended after discharge and f/u with [facility] PCP in 2 weeks.     PRINCIPAL DISCHARGE DIAGNOSIS  Diagnosis: Syncope  Assessment and Plan of Treatment: You came to the hospityal bc of a syncopal episode you were given fluids and flu medication and had your orthostatic blood presssure measured until you felt okay. Please continue to take your flu medication and follow up with your PCP in2 weeks after discharge.      SECONDARY DISCHARGE DIAGNOSES  Diagnosis: Viral conjunctivitis  Assessment and Plan of Treatment: You were given eye drops because of viral infection in your eye. Continue to take the eye drops as recommended after discharge and f/u with [facility] PCP in 2 weeks.

## 2023-12-11 NOTE — DISCHARGE NOTE PROVIDER - PROVIDER TOKENS
PROVIDER:[TOKEN:[[id number]],FOLLOWUP:[2 weeks],ESTABLISHEDPATIENT:[T]] PROVIDER:[TOKEN:[[id number]],FOLLOWUP:[2 weeks],ESTABLISHEDPATIENT:[T]]

## 2023-12-11 NOTE — DISCHARGE NOTE NURSING/CASE MANAGEMENT/SOCIAL WORK - NSDCPEFALRISK_GEN_ALL_CORE
For information on Fall & Injury Prevention, visit: [email or web address] OR  [email or web address] OR  https://www.cdc.gov/steadi/patient.html For information on Fall & Injury Prevention, visit: [email or web address] OR  [email or web address] OR  https://www.cdc.gov/steadi/patient.html

## 2023-12-11 NOTE — DISCHARGE NOTE NURSING/CASE MANAGEMENT/SOCIAL WORK - PATIENT PORTAL LINK FT
You can access the FollowMyHealth Patient Portal offered by [facility] by registering at the following website: [email or web address]. By joining [facility]’s FollowMyHealth portal, you will also be able to view your health information using other applications (apps) compatible with our system. You can access the FollowMyHealth Patient Portal offered by [facility] by registering at the following website: [email or web address]. By joining [facility]’s FollowMyHealth portal, you will also be able to view your health information using other applications (apps) compatible with our system.

## 2023-12-11 NOTE — ED ADULT NURSE REASSESSMENT NOTE - NS ED NURSE REASSESS COMMENT FT1
MD and Resident came to see patient- patient discharged home, IV taken out, patient left without discharge paperwork,.

## 2023-12-11 NOTE — DISCHARGE NOTE PROVIDER - NSDCMRMEDTOKEN_GEN_ALL_CORE_FT
erythromycin 0.5% ophthalmic ointment: 1 application to each affected eye every 6 hours  oseltamivir 75 mg oral capsule: 1 cap(s) orally 2 times a day  tamsulosin 0.4 mg oral capsule: 1 cap(s) orally once a day (at bedtime)

## 2023-12-14 DIAGNOSIS — J10.1 INFLUENZA DUE TO OTHER IDENTIFIED INFLUENZA VIRUS WITH OTHER RESPIRATORY MANIFESTATIONS: ICD-10-CM

## 2023-12-14 DIAGNOSIS — A41.9 SEPSIS, UNSPECIFIED ORGANISM: ICD-10-CM

## 2023-12-14 DIAGNOSIS — E78.5 HYPERLIPIDEMIA, UNSPECIFIED: ICD-10-CM

## 2023-12-14 DIAGNOSIS — B30.9 VIRAL CONJUNCTIVITIS, UNSPECIFIED: ICD-10-CM

## 2023-12-14 DIAGNOSIS — N40.0 BENIGN PROSTATIC HYPERPLASIA WITHOUT LOWER URINARY TRACT SYMPTOMS: ICD-10-CM

## 2023-12-14 DIAGNOSIS — I10 ESSENTIAL (PRIMARY) HYPERTENSION: ICD-10-CM

## 2023-12-14 DIAGNOSIS — R55 SYNCOPE AND COLLAPSE: ICD-10-CM

## 2023-12-14 DIAGNOSIS — E04.1 NONTOXIC SINGLE THYROID NODULE: ICD-10-CM

## 2023-12-14 LAB
CULTURE RESULTS: SIGNIFICANT CHANGE UP
SPECIMEN SOURCE: SIGNIFICANT CHANGE UP

## 2025-06-11 ENCOUNTER — APPOINTMENT (OUTPATIENT)
Dept: ORTHOPEDIC SURGERY | Facility: CLINIC | Age: 85
End: 2025-06-11
Payer: MEDICARE

## 2025-06-11 PROBLEM — M79.604 PAIN IN BOTH LOWER EXTREMITIES: Status: ACTIVE | Noted: 2025-06-11

## 2025-06-11 PROCEDURE — 73522 X-RAY EXAM HIPS BI 3-4 VIEWS: CPT

## 2025-06-11 PROCEDURE — 72110 X-RAY EXAM L-2 SPINE 4/>VWS: CPT

## 2025-06-11 PROCEDURE — 99203 OFFICE O/P NEW LOW 30 MIN: CPT

## 2025-06-19 ENCOUNTER — APPOINTMENT (OUTPATIENT)
Dept: PAIN MANAGEMENT | Facility: CLINIC | Age: 85
End: 2025-06-19

## 2025-06-19 VITALS — HEIGHT: 60 IN | BODY MASS INDEX: 27.48 KG/M2 | WEIGHT: 140 LBS

## 2025-06-21 ENCOUNTER — APPOINTMENT (OUTPATIENT)
Dept: MRI IMAGING | Facility: CLINIC | Age: 85
End: 2025-06-21

## 2025-06-21 PROCEDURE — 72148 MRI LUMBAR SPINE W/O DYE: CPT

## 2025-06-24 ENCOUNTER — APPOINTMENT (OUTPATIENT)
Dept: PAIN MANAGEMENT | Facility: CLINIC | Age: 85
End: 2025-06-24

## 2025-06-24 PROBLEM — M54.16 LUMBAR RADICULOPATHY: Status: ACTIVE | Noted: 2025-06-24

## 2025-06-24 PROCEDURE — 99204 OFFICE O/P NEW MOD 45 MIN: CPT

## 2025-06-24 RX ORDER — GABAPENTIN 100 MG/1
100 CAPSULE ORAL
Qty: 90 | Refills: 1 | Status: ACTIVE | COMMUNITY
Start: 2025-06-24 | End: 1900-01-01

## 2025-06-24 RX ORDER — OLMESARTAN MEDOXOMIL 40 MG/1
TABLET, FILM COATED ORAL
Refills: 0 | Status: ACTIVE | COMMUNITY

## 2025-06-24 RX ORDER — FINASTERIDE 1 MG/1
TABLET ORAL
Refills: 0 | Status: ACTIVE | COMMUNITY

## 2025-07-22 ENCOUNTER — APPOINTMENT (OUTPATIENT)
Dept: PAIN MANAGEMENT | Facility: CLINIC | Age: 85
End: 2025-07-22